# Patient Record
Sex: MALE | Race: WHITE | URBAN - METROPOLITAN AREA
[De-identification: names, ages, dates, MRNs, and addresses within clinical notes are randomized per-mention and may not be internally consistent; named-entity substitution may affect disease eponyms.]

---

## 2021-03-23 ENCOUNTER — IMPORTED ENCOUNTER (OUTPATIENT)
Dept: URBAN - METROPOLITAN AREA CLINIC 50 | Facility: CLINIC | Age: 81
End: 2021-03-23

## 2022-04-26 NOTE — PATIENT DISCUSSION
Cataracts are visually significant and patient is ready to consider surgery; anticipate improved visual acuity but no guarantee of outcome; refer for consult. Dense cataracts per JOD. Recommended surgery.

## 2022-04-26 NOTE — PATIENT DISCUSSION
Cataracts are visually significant and patient is ready to consider surgery; anticipate improved visual acuity but no guarantee of outcome; refer for consult. Recommended surgery. Pt elects B+ lizzie OU. Pt ed that they would lose their near vision and would need glasses for reading and distance.

## 2022-04-26 NOTE — PATIENT DISCUSSION
The types of intraocular lenses were reviewed with the patient along with a discussion of their various strengths and weaknesses. Patient elects to proceed with B+ lizzie OU. Patient ed they will lose their reading power and they would need glasses with prism to correct diplopia.

## 2022-06-22 NOTE — PATIENT DISCUSSION
4 week PO: Patient is doing well post-operatively. The importance of post-op drop compliance was emphasized. Drop scheduled reviewed with patient. Patient to call if any visual changes or concerns.
